# Patient Record
Sex: FEMALE | Race: WHITE
[De-identification: names, ages, dates, MRNs, and addresses within clinical notes are randomized per-mention and may not be internally consistent; named-entity substitution may affect disease eponyms.]

---

## 2020-09-28 ENCOUNTER — HOSPITAL ENCOUNTER (EMERGENCY)
Dept: HOSPITAL 94 - ER | Age: 20
Discharge: HOME | End: 2020-09-28
Payer: MEDICAID

## 2020-09-28 VITALS — HEIGHT: 65 IN | WEIGHT: 145.31 LBS | BODY MASS INDEX: 24.21 KG/M2

## 2020-09-28 VITALS — DIASTOLIC BLOOD PRESSURE: 73 MMHG | SYSTOLIC BLOOD PRESSURE: 121 MMHG
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
## 2020-09-28 DIAGNOSIS — S62.396A: Primary | ICD-10-CM
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
## 2020-09-28 PROCEDURE — 73130 X-RAY EXAM OF HAND: CPT

## 2020-09-28 PROCEDURE — 29125 APPL SHORT ARM SPLINT STATIC: CPT

## 2020-09-28 PROCEDURE — 99283 EMERGENCY DEPT VISIT LOW MDM: CPT

## 2020-10-13 LAB
BASOPHILS # BLD AUTO: 0.1 X10'3 (ref 0–0.2)
BASOPHILS NFR BLD AUTO: 0.6 % (ref 0–1)
EOSINOPHIL # BLD AUTO: 0.1 X10'3 (ref 0–0.9)
EOSINOPHIL NFR BLD AUTO: 0.7 % (ref 0–6)
ERYTHROCYTE [DISTWIDTH] IN BLOOD BY AUTOMATED COUNT: 13.9 % (ref 11.5–14.5)
HCG SERPL QL: NEGATIVE
HCT VFR BLD AUTO: 37.8 % (ref 35–45)
HGB BLD-MCNC: 12.7 G/DL (ref 12–16)
LYMPHOCYTES # BLD AUTO: 3.4 X10'3 (ref 1.1–4.8)
LYMPHOCYTES NFR BLD AUTO: 35.6 % (ref 21–51)
MCH RBC QN AUTO: 31.5 PG (ref 27–31)
MCHC RBC AUTO-ENTMCNC: 33.7 G/DL (ref 33–36.5)
MCV RBC AUTO: 93.5 FL (ref 78–98)
MONOCYTES # BLD AUTO: 0.5 X10'3 (ref 0–0.9)
MONOCYTES NFR BLD AUTO: 5 % (ref 2–12)
NEUTROPHILS # BLD AUTO: 5.6 X10'3 (ref 1.8–7.7)
NEUTROPHILS NFR BLD AUTO: 58.1 % (ref 42–75)
PLATELET # BLD AUTO: 260 X10'3 (ref 140–440)
PMV BLD AUTO: 9.1 FL (ref 7.4–10.4)
RBC # BLD AUTO: 4.04 X10'6 (ref 4.2–5.6)

## 2020-10-14 ENCOUNTER — HOSPITAL ENCOUNTER (OUTPATIENT)
Dept: HOSPITAL 94 - PAS | Age: 20
Discharge: HOME | End: 2020-10-14
Attending: ORTHOPAEDIC SURGERY
Payer: COMMERCIAL

## 2020-10-14 VITALS — SYSTOLIC BLOOD PRESSURE: 113 MMHG | DIASTOLIC BLOOD PRESSURE: 69 MMHG

## 2020-10-14 VITALS — DIASTOLIC BLOOD PRESSURE: 57 MMHG | SYSTOLIC BLOOD PRESSURE: 106 MMHG

## 2020-10-14 VITALS — DIASTOLIC BLOOD PRESSURE: 67 MMHG | SYSTOLIC BLOOD PRESSURE: 111 MMHG

## 2020-10-14 VITALS — SYSTOLIC BLOOD PRESSURE: 106 MMHG | DIASTOLIC BLOOD PRESSURE: 57 MMHG

## 2020-10-14 VITALS — SYSTOLIC BLOOD PRESSURE: 106 MMHG | DIASTOLIC BLOOD PRESSURE: 70 MMHG

## 2020-10-14 VITALS — WEIGHT: 149.69 LBS | HEIGHT: 65 IN | BODY MASS INDEX: 24.94 KG/M2

## 2020-10-14 VITALS — DIASTOLIC BLOOD PRESSURE: 68 MMHG | SYSTOLIC BLOOD PRESSURE: 112 MMHG

## 2020-10-14 VITALS — SYSTOLIC BLOOD PRESSURE: 110 MMHG | DIASTOLIC BLOOD PRESSURE: 65 MMHG
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
## 2020-10-14 DIAGNOSIS — F32.9: ICD-10-CM

## 2020-10-14 DIAGNOSIS — F12.90: ICD-10-CM

## 2020-10-14 DIAGNOSIS — Z87.891: ICD-10-CM
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
## 2020-10-14 DIAGNOSIS — S62.396A: Primary | ICD-10-CM
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
## 2020-10-14 DIAGNOSIS — Z79.899: ICD-10-CM

## 2020-10-14 DIAGNOSIS — F41.9: ICD-10-CM
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
## 2020-10-14 PROCEDURE — 82948 REAGENT STRIP/BLOOD GLUCOSE: CPT

## 2020-10-14 PROCEDURE — 36415 COLL VENOUS BLD VENIPUNCTURE: CPT

## 2020-10-14 PROCEDURE — 26615 TREAT METACARPAL FRACTURE: CPT

## 2020-10-14 PROCEDURE — 84703 CHORIONIC GONADOTROPIN ASSAY: CPT

## 2020-10-14 PROCEDURE — 85025 COMPLETE CBC W/AUTO DIFF WBC: CPT

## 2020-10-14 NOTE — NUR
PATIENT A&OX4, DENIES PAIN, V/S WNL, NEUROVASCULAR CHECKS INTACT, 20G PIV LUE D/C, SCD OFF, 
SPLINT DRESSING TO RIGHT WRIST CDI ELEVATED WITH ICEBAG APPLIED. I HAVE REVIEWED D/C 
INSTRUCTIONS WITH PATIENT AND FAMILY AND THEY HAVE VERBALIZED UNDERSTANDING. PATIENT D/C 
HOME WITH ALL BELONGINGS AND FAMILY GAVE TRANSPORT HOME.

## 2020-10-14 NOTE — NUR
Patient asked screening questions for symptoms of covid. All questions answered with no. 
Will inform anesthesiologist.

## 2020-10-14 NOTE — NUR
Received from OR via BED, accompanied by Anesthesiologist DR ROSALES and report given by 
Anesthesiolgist. PATIENT A&OX4, DENIES PAIN, V/S WNL, NEUROVASCULAR CHECKS INTACT, 20G PIV 
LUE, SCD ON, SPLINT DRESSING TO RIGHT WRIST CDI ELEVATED WITH ICEBAG APPLIED.

## 2021-03-18 ENCOUNTER — HOSPITAL ENCOUNTER (EMERGENCY)
Dept: HOSPITAL 94 - ER | Age: 21
Discharge: HOME | End: 2021-03-18
Payer: MEDICAID

## 2021-03-18 VITALS — DIASTOLIC BLOOD PRESSURE: 75 MMHG | SYSTOLIC BLOOD PRESSURE: 117 MMHG

## 2021-03-18 VITALS — WEIGHT: 165.35 LBS | BODY MASS INDEX: 27.55 KG/M2 | HEIGHT: 65 IN

## 2021-03-18 DIAGNOSIS — R11.2: ICD-10-CM

## 2021-03-18 DIAGNOSIS — R10.84: Primary | ICD-10-CM

## 2021-03-18 DIAGNOSIS — Z79.899: ICD-10-CM

## 2021-03-18 DIAGNOSIS — R10.2: ICD-10-CM

## 2021-03-18 LAB
CLARITY UR: CLEAR
COLOR UR: YELLOW
GLUCOSE UR STRIP-MCNC: NEGATIVE MG/DL
HCG UR QL: NEGATIVE
HGB UR QL STRIP: NEGATIVE
KETONES UR STRIP-MCNC: 15 MG/DL
LEUKOCYTE ESTERASE UR QL STRIP: NEGATIVE
NITRITE UR QL STRIP: NEGATIVE
PH UR STRIP: 6.5 [PH] (ref 4.8–8)
PROT UR QL STRIP: NEGATIVE MG/DL
SP GR UR STRIP: 1.02 (ref 1–1.03)
URN COLLECT METHOD CLASS: (no result)
UROBILINOGEN UR STRIP-MCNC: 0.2 E.U/DL (ref 0.2–1)

## 2021-03-18 PROCEDURE — 81003 URINALYSIS AUTO W/O SCOPE: CPT

## 2021-03-18 PROCEDURE — 99283 EMERGENCY DEPT VISIT LOW MDM: CPT

## 2021-03-18 PROCEDURE — 81025 URINE PREGNANCY TEST: CPT

## 2021-03-18 PROCEDURE — 96372 THER/PROPH/DIAG INJ SC/IM: CPT

## 2021-03-18 NOTE — NUR
pt unable to provide urine sample, states she urinated at home before coming 
and says she can't void.

## 2022-08-19 ENCOUNTER — HOSPITAL ENCOUNTER (EMERGENCY)
Dept: HOSPITAL 94 - ER | Age: 22
Discharge: HOME | End: 2022-08-19
Payer: COMMERCIAL

## 2022-08-19 VITALS — HEIGHT: 66 IN | WEIGHT: 170.35 LBS | BODY MASS INDEX: 27.38 KG/M2

## 2022-08-19 VITALS — DIASTOLIC BLOOD PRESSURE: 83 MMHG | SYSTOLIC BLOOD PRESSURE: 113 MMHG
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
## 2022-08-19 DIAGNOSIS — S91.312A: Primary | ICD-10-CM
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
## 2022-08-19 PROCEDURE — 90715 TDAP VACCINE 7 YRS/> IM: CPT

## 2022-08-19 PROCEDURE — 90471 IMMUNIZATION ADMIN: CPT

## 2022-08-19 PROCEDURE — 73630 X-RAY EXAM OF FOOT: CPT

## 2022-08-19 PROCEDURE — 12002 RPR S/N/AX/GEN/TRNK2.6-7.5CM: CPT

## 2022-08-19 PROCEDURE — 99284 EMERGENCY DEPT VISIT MOD MDM: CPT

## 2025-02-18 ENCOUNTER — HOSPITAL ENCOUNTER (EMERGENCY)
Dept: HOSPITAL 94 - ER | Age: 25
Discharge: HOME | End: 2025-02-18
Payer: COMMERCIAL

## 2025-02-18 VITALS — WEIGHT: 180.38 LBS | HEIGHT: 65 IN | BODY MASS INDEX: 30.05 KG/M2

## 2025-02-18 VITALS
SYSTOLIC BLOOD PRESSURE: 122 MMHG | RESPIRATION RATE: 18 BRPM | DIASTOLIC BLOOD PRESSURE: 78 MMHG | HEART RATE: 119 BPM | OXYGEN SATURATION: 98 % | TEMPERATURE: 99.8 F

## 2025-02-18 DIAGNOSIS — J02.0: Primary | ICD-10-CM

## 2025-02-18 DIAGNOSIS — Z79.1: ICD-10-CM

## 2025-02-18 LAB — S PYO AG THROAT QL IA.RAPID: POSITIVE

## 2025-02-18 PROCEDURE — 99284 EMERGENCY DEPT VISIT MOD MDM: CPT

## 2025-02-18 PROCEDURE — 87880 STREP A ASSAY W/OPTIC: CPT

## 2025-02-18 RX ADMIN — Medication ONE MG: at 08:42

## 2025-02-18 RX ADMIN — DEXAMETHASONE SODIUM PHOSPHATE STA MG: 10 INJECTION INTRAMUSCULAR; INTRAVENOUS at 08:42
